# Patient Record
Sex: FEMALE | Race: ASIAN | URBAN - METROPOLITAN AREA
[De-identification: names, ages, dates, MRNs, and addresses within clinical notes are randomized per-mention and may not be internally consistent; named-entity substitution may affect disease eponyms.]

---

## 2017-02-09 ENCOUNTER — OFFICE VISIT (OUTPATIENT)
Dept: OBGYN CLINIC | Age: 27
End: 2017-02-09

## 2017-02-09 VITALS
WEIGHT: 140 LBS | DIASTOLIC BLOOD PRESSURE: 80 MMHG | SYSTOLIC BLOOD PRESSURE: 116 MMHG | BODY MASS INDEX: 31.49 KG/M2 | HEIGHT: 56 IN

## 2017-02-09 DIAGNOSIS — T19.2XXA RETAINED TAMPON, INITIAL ENCOUNTER: Primary | ICD-10-CM

## 2017-02-09 RX ORDER — METRONIDAZOLE 500 MG/1
500 TABLET ORAL 2 TIMES DAILY
Qty: 14 TAB | Refills: 0 | Status: SHIPPED | OUTPATIENT
Start: 2017-02-09 | End: 2017-02-16

## 2017-02-09 NOTE — PROGRESS NOTES
Vaginitis evaluation    Chief Complaint   Vaginitis      HPI  23 y.o. female complains of white vaginal discharge with a foul odor for several days. No LMP recorded. She denies additional symptoms at this time. The patient denies aggravating factors. She is not concerned about possible STI exposure at this time. She denies exposure to new chemicals ot hygenic agents  Previous treatment included: none    Past Medical History   Diagnosis Date    Abnormal Papanicolaou smear of cervix 2011 per pt    Headache     Memory loss     Musculoskeletal disorder      Past Surgical History   Procedure Laterality Date    Hx gyn  cryo 2011 per pt     Social History     Occupational History    Not on file. Social History Main Topics    Smoking status: Never Smoker    Smokeless tobacco: Never Used    Alcohol use 0.6 oz/week     1 Cans of beer per week    Drug use: No    Sexual activity: Yes     Partners: Male     Birth control/ protection: Pill     Family History   Problem Relation Age of Onset    No Known Problems Mother         Allergies   Allergen Reactions    Seafood Anaphylaxis    Shellfish Derived Anaphylaxis    Penicillins Unknown (comments)     Prior to Admission medications    Medication Sig Start Date End Date Taking? Authorizing Provider   ibuprofen (MOTRIN) 200 mg tablet Take 400 mg by mouth four (4) times daily. Historical Provider   multivitamin (ONE A DAY) tablet Take 1 Tab by mouth daily. Historical Provider   topiramate (TOPAMAX) 25 mg tablet 1 po qhs 6/27/16   Ritesh Black MD   SUMAtriptan (IMITREX) 100 mg tablet Take 1 Tab by mouth once as needed for Migraine for up to 1 dose. 6/27/16   Ritesh Black MD   ondansetron (ZOFRAN ODT) 4 mg disintegrating tablet Take 1 Tab by mouth every eight (8) hours as needed for Nausea. 6/27/16   Ritesh Black MD   norethindrone-ethinyl estradiol (JUNEL FE 1/20) 1 mg-20 mcg (21)/75 mg (7) tab Take 1 Tab by mouth daily.  6/17/16   Shy FERRARA Yas Andersen MD                      Review of Systems - History obtained from the patient  Constitutional: negative for weight loss, fever, night sweats  Breast: negative for breast lumps, nipple discharge, galactorrhea  GI: negative for change in bowel habits, abdominal pain, black or bloody stools  : negative for frequency, dysuria, hematuria  MSK: negative for back pain, joint pain, muscle pain  Skin: negative for itching, rash, hives  Neuro: negative for dizziness, headache, confusion, weakness  Psych: negative for anxiety, depression, change in mood  Heme/lymph: negative for bleeding, bruising, pallor       Objective:    Visit Vitals    /80    Ht 4' 8\" (1.422 m)    Wt 140 lb (63.5 kg)    BMI 31.39 kg/m2       Physical Exam:   PHYSICAL EXAMINATION    Constitutional  · Appearance: well-nourished, well developed, alert, in no acute distress    HENT  · Head and Face: appears normal    Genitourinary  · External Genitalia: normal appearance for age, + discharge present, no tenderness present, no inflammatory lesions present, no masses present, no atrophy present  · Vagina:  + discharge present, + tampon in place  · Bladder: non-tender to palpation  · Urethra: appears normal  · Cervix: normal     Skin  · General Inspection: no rash, no lesions identified    Neurologic/Psychiatric  · Mental Status:  · Orientation: grossly oriented to person, place and time  · Mood and Affect: mood normal, affect appropriate    Assessment:   Retained tampon, removed. Will treat with flagyl x 2 weeks.     Plan:   ROV prn

## 2023-05-09 ENCOUNTER — TELEPHONE (OUTPATIENT)
Age: 33
End: 2023-05-09